# Patient Record
Sex: FEMALE | Race: AMERICAN INDIAN OR ALASKA NATIVE
[De-identification: names, ages, dates, MRNs, and addresses within clinical notes are randomized per-mention and may not be internally consistent; named-entity substitution may affect disease eponyms.]

---

## 2018-02-02 ENCOUNTER — HOSPITAL ENCOUNTER (EMERGENCY)
Dept: HOSPITAL 5 - ED | Age: 53
Discharge: HOME | End: 2018-02-02
Payer: COMMERCIAL

## 2018-02-02 VITALS — SYSTOLIC BLOOD PRESSURE: 179 MMHG | DIASTOLIC BLOOD PRESSURE: 116 MMHG

## 2018-02-02 DIAGNOSIS — I10: Primary | ICD-10-CM

## 2018-02-02 PROCEDURE — 99282 EMERGENCY DEPT VISIT SF MDM: CPT

## 2018-02-02 NOTE — EMERGENCY DEPARTMENT REPORT
HPI





- General


Chief Complaint: High BP


Time Seen by Provider: 02/02/18 17:29





- HPI


HPI: 





53-year-old female presents from urgent care with elevated blood pressure.  She 

was evaluated for flulike symptoms.





She was never told that she had elevated blood pressure.  She denies headache.  

She denies chest pain.  Denies abdominal pain.





ED Past Medical Hx





- Past Medical History


Previous Medical History?: No





- Social History


Smoking Status: Never Smoker


Substance Use Type: None





- Medications


Home Medications: 


 Home Medications











 Medication  Instructions  Recorded  Confirmed  Last Taken  Type


 


Hydrochlorothiazide [HCTZ] 25 mg PO QDAY 30 Days #30 tablet 02/02/18  Unknown Rx


 


amLODIPine [Norvasc] 5 mg PO DAILY 30 Days #30 tab 02/02/18  Unknown Rx














ED Review of Systems


ROS: 


Stated complaint: HYPERTENSIVE/FLU LIKE SYMPTOMS


Other details as noted in HPI





Comment: All other systems reviewed and negative


Constitutional: fever, malaise


Respiratory: denies: cough


Cardiovascular: denies: chest pain





Physical Exam





- Physical Exam


Vital Signs: 


 Vital Signs











  02/02/18 02/02/18





  16:15 16:27


 


Temperature 98.2 F 


 


Pulse Rate 109 H 109 H


 


Respiratory 18 





Rate  


 


Blood Pressure 189/126 189/126


 


O2 Sat by Pulse 97 





Oximetry  











Physical Exam: 





General:  Well-appearing, no acute distress


HEENT: Normocephalic atraumatic pupils equal round and reactive to light 

anicteric sclera 


  Nose: no rhinorrhea


  Oropharynx: Clear mucous membranes no lesions


Neck: supple, no meningismus


Chest: Clear to auscultation bilaterally no rales rhonchi no wheezes


Cardiac: Regular rate and rhythm no murmurs no rubs no gallops


Abdomen: Soft nontender nondistended positive bowel sounds no guarding


Extremities: No cyanosis no clubbing no edema


Neuro: Moves all extremities 4, no gross deficits


Psychiatric: Alert and oriented 4 normal aspect normal judgment normal inside





ED Course


 Vital Signs











  02/02/18 02/02/18





  16:15 16:27


 


Temperature 98.2 F 


 


Pulse Rate 109 H 109 H


 


Respiratory 18 





Rate  


 


Blood Pressure 189/126 189/126


 


O2 Sat by Pulse 97 





Oximetry  














ED Medical Decision Making





- Medical Decision Making





Mr. Chacon presents with asymptomatic hypertension.


Prescription: Amlodipine, hydrochlorothiazide





She has referral for PCPs


Critical care attestation.: 


If time is entered above; I have spent that time in minutes in the direct care 

of this critically ill patient, excluding procedure time.








ED Disposition


Clinical Impression: 


 Hypertensive urgency





Disposition: DC-01 TO HOME OR SELFCARE


Is pt being admited?: No


Does the pt Need Aspirin: No


Condition: Stable


Prescriptions: 


amLODIPine [Norvasc] 5 mg PO DAILY 30 Days #30 tab


Hydrochlorothiazide [HCTZ] 25 mg PO QDAY 30 Days #30 tablet


Referrals: 


PRIMARY CARE,MD [Primary Care Provider] - 3-5 Days


Time of Disposition: 17:35